# Patient Record
Sex: MALE | Race: WHITE | NOT HISPANIC OR LATINO
[De-identification: names, ages, dates, MRNs, and addresses within clinical notes are randomized per-mention and may not be internally consistent; named-entity substitution may affect disease eponyms.]

---

## 2022-06-28 ENCOUNTER — APPOINTMENT (OUTPATIENT)
Dept: ORTHOPEDIC SURGERY | Facility: CLINIC | Age: 32
End: 2022-06-28
Payer: COMMERCIAL

## 2022-06-28 DIAGNOSIS — M75.21 BICIPITAL TENDINITIS, RIGHT SHOULDER: ICD-10-CM

## 2022-06-28 DIAGNOSIS — M75.51 BURSITIS OF RIGHT SHOULDER: ICD-10-CM

## 2022-06-28 PROBLEM — Z00.00 ENCOUNTER FOR PREVENTIVE HEALTH EXAMINATION: Status: ACTIVE | Noted: 2022-06-28

## 2022-06-28 PROCEDURE — 73030 X-RAY EXAM OF SHOULDER: CPT | Mod: RT

## 2022-06-28 PROCEDURE — 99203 OFFICE O/P NEW LOW 30 MIN: CPT | Mod: 25

## 2022-06-28 NOTE — PROCEDURE
[Large Joint Injection] : Large joint injection [Right] : of the right [Subacromial Space] : subacromial space [Pain] : pain [Inflammation] : inflammation [Alcohol] : alcohol [Betadine] : betadine [Ethyl Chloride sprayed topically] : ethyl chloride sprayed topically [Sterile technique used] : sterile technique used [___ cc    3mg] :  Betamethasone (Celestone) ~Vcc of 3mg [___ cc    1%] : Lidocaine ~Vcc of 1%  [___ cc    0.25%] : Bupivacaine (Marcaine) ~Vcc of 0.25%  [Call if redness, pain or fever occur] : call if redness, pain or fever occur [Apply ice for 15min out of every hour for the next 12-24 hours as tolerated] : apply ice for 15 minutes out of every hour for the next 12-24 hours as tolerated [Patient was advised to rest the joint(s) for ____ days] : patient was advised to rest the joint(s) for [unfilled] days [Previous OTC use and PT nontherapeutic] : patient has tried OTC's including aspirin, Ibuprofen, Aleve, etc or prescription NSAIDS, and/or exercises at home and/or physical therapy without satisfactory response [Patient had decreased mobility in the joint] : patient had decreased mobility in the joint [Risks, benefits, alternatives discussed / Verbal consent obtained] : the risks benefits, and alternatives have been discussed, and verbal consent was obtained

## 2022-06-28 NOTE — HISTORY OF PRESENT ILLNESS
[7] : 7 [3] : 3 [Dull/Aching] : dull/aching [Localized] : localized [Full time] : Work status: full time [de-identified] : 6/28/22: Patient is a 30 yo RHD male c/o right shoulder pain for 2 months with no specific injury. No n/t. Not taking any medication for pain. Pain is worse with reaching over head. No previous injuries or surgeries to right shoulder.\par Occupation:   [] : Post Surgical Visit: no [FreeTextEntry1] : Rt shoulder [FreeTextEntry5] : patient is here with shoulder pain since a few months \par no injury [de-identified] : movement

## 2022-06-28 NOTE — PHYSICAL EXAM
[5 ___] : forward flexion 5[unfilled]/5 [5___] : internal rotation 5[unfilled]/5 [Right] : right shoulder [There are no fractures, subluxations or dislocations. No significant abnormalities are seen] : There are no fractures, subluxations or dislocations. No significant abnormalities are seen [] : no swelling [TWNoteComboBox7] : active forward flexion 170 degrees [de-identified] : external rotation at 90 degrees of abduction 80 degrees [TWNoteComboBox5] : internal rotation at 90 degrees of abduction 90 degrees

## 2022-06-28 NOTE — ASSESSMENT
[FreeTextEntry1] : Xrays reviewed with patient\par Treatment options discussed \par Bicep/SA Injection done today, tolerated well\par Recommend course of PT/HEP\par Follow up with Dr. Long in 1 month, if pain persists may need MRI to eval labral tear

## 2022-08-11 ENCOUNTER — APPOINTMENT (OUTPATIENT)
Dept: ORTHOPEDIC SURGERY | Facility: CLINIC | Age: 32
End: 2022-08-11